# Patient Record
Sex: MALE | Race: ASIAN | NOT HISPANIC OR LATINO | URBAN - METROPOLITAN AREA
[De-identification: names, ages, dates, MRNs, and addresses within clinical notes are randomized per-mention and may not be internally consistent; named-entity substitution may affect disease eponyms.]

---

## 2020-09-02 ENCOUNTER — OFFICE VISIT (OUTPATIENT)
Dept: URBAN - METROPOLITAN AREA CLINIC 98 | Facility: CLINIC | Age: 80
End: 2020-09-02
Payer: MEDICARE

## 2020-09-02 VITALS
HEIGHT: 60 IN | WEIGHT: 129.4 LBS | BODY MASS INDEX: 25.4 KG/M2 | HEART RATE: 59 BPM | TEMPERATURE: 97.5 F | DIASTOLIC BLOOD PRESSURE: 69 MMHG | SYSTOLIC BLOOD PRESSURE: 129 MMHG

## 2020-09-02 DIAGNOSIS — R05 COUGH: ICD-10-CM

## 2020-09-02 DIAGNOSIS — K21.9 GERD: ICD-10-CM

## 2020-09-02 PROCEDURE — G8427 DOCREV CUR MEDS BY ELIG CLIN: HCPCS | Performed by: INTERNAL MEDICINE

## 2020-09-02 PROCEDURE — G9903 PT SCRN TBCO ID AS NON USER: HCPCS | Performed by: INTERNAL MEDICINE

## 2020-09-02 PROCEDURE — G8420 CALC BMI NORM PARAMETERS: HCPCS | Performed by: INTERNAL MEDICINE

## 2020-09-02 PROCEDURE — 99214 OFFICE O/P EST MOD 30 MIN: CPT | Performed by: INTERNAL MEDICINE

## 2020-09-02 RX ORDER — ATENOLOL 25 MG/1
TAKE 1 TABLET (25 MG) BY ORAL ROUTE ONCE DAILY TABLET ORAL 1
Qty: 0 | Refills: 0 | Status: ACTIVE | COMMUNITY
Start: 1900-01-01

## 2020-09-02 RX ORDER — ASPIRIN 81 MG/1
TAKE 1 TABLET (81 MG) BY ORAL ROUTE ONCE DAILY TABLET, COATED ORAL 1
Qty: 0 | Refills: 0 | Status: ACTIVE | COMMUNITY
Start: 1900-01-01

## 2020-09-02 RX ORDER — ESOMEPRAZOLE MAGNESIUM 40 MG
TAKE 1 CAPSULE (40 MG) BY ORAL ROUTE ONCE DAILY CAPSULE,DELAYED RELEASE (ENTERIC COATED) ORAL 1
Qty: 0 | Refills: 0 | Status: ACTIVE | COMMUNITY
Start: 1900-01-01

## 2020-09-02 RX ORDER — EPLERENONE 25 MG/1
TAKE 1 TABLET (25 MG) BY ORAL ROUTE ONCE DAILY TABLET, FILM COATED ORAL 1
Qty: 0 | Refills: 0 | Status: ACTIVE | COMMUNITY
Start: 1900-01-01

## 2020-09-02 RX ORDER — FAMOTIDINE 20 MG/1
1 TABLET AT BEDTIME AS NEEDED TABLET, FILM COATED ORAL ONCE A DAY
Qty: 30 TABLET | Refills: 1 | OUTPATIENT
Start: 2020-09-02

## 2020-09-02 RX ORDER — ESOMEPRAZOLE MAGNESIUM 40 MG
TAKE 1 CAPSULE (40 MG) BY ORAL ROUTE ONCE DAILY CAPSULE,DELAYED RELEASE (ENTERIC COATED) ORAL 1
Qty: 30 | Refills: 3
Start: 1900-01-01

## 2020-09-02 RX ORDER — FINASTERIDE 5 MG/1
TAKE 1 TABLET (5 MG) BY ORAL ROUTE ONCE DAILY TABLET, FILM COATED ORAL 1
Qty: 0 | Refills: 0 | Status: ACTIVE | COMMUNITY
Start: 1900-01-01

## 2020-09-02 NOTE — HPI-TODAY'S VISIT:
Mr. Scherer is an 78 yo M w/ history of partial colectomy for diverticulosis presenting with acid reflux. He has had GERD for 20 years. He was on omeprazole for several years and then he was switched to Nexium 40 mg PO daily. In February he began to have a cough at night. The cough has now changed and is constant during the day and night. His heartburn and acid regurgitation feel completely controlled on the Nexium. He feels he has a tickle high in his throat or something that he is trying to clear. He has no odynophagia or dysphagia. He says that he has a lung problem for which he sees an infectious disease team. He has no fever or chills. He has not seen ENT or pulmonology for this problem and says he has not had recent chest imaging. His cough is mostly dry but occasionally wet and he feels he has a runny nose.  His last EGD and colonoscopy were 3 years ago and he says this was normal.

## 2020-09-02 NOTE — EXAM-PHYSICAL EXAM
General Examination   Constitutional: well-developed, normal communication ability.   Eyes: Conjunctivae and eyelids appear normal, no scleral icterus.   Nose/nasopharynx, external nose: appear normal, normal appearance of lips. Blowing his nose during exam.   Neck/thyroid: normal appearance, no masses felt.   Chest: No lesions or deformities.  Pulmonary: crackles mid left lung fied and lower right lung field.   Cardiovascular: No edema, no murmurs or gallops appreciated.   Gastrointestinal: + scars, normoactive bowel sounds, soft, no tenderness, no rebound tenderness, no shifting dullness, no organomegaly.   Lymphatic: Neck without lymphadenopathy.   Musculoskeletal: normal gait and station, no tenderness present.   Skin: no rashes or jaundice. No tenderness on palpation.    Neurologic: Oriented to person, place, time. Normal sensation, short term memory intact.    Psychiatric: Normal mood and appropriate affect.

## 2021-01-06 ENCOUNTER — OFFICE VISIT (OUTPATIENT)
Dept: URBAN - METROPOLITAN AREA CLINIC 98 | Facility: CLINIC | Age: 81
End: 2021-01-06
Payer: MEDICARE

## 2021-01-06 VITALS
SYSTOLIC BLOOD PRESSURE: 110 MMHG | HEIGHT: 69 IN | WEIGHT: 130.8 LBS | TEMPERATURE: 97.2 F | DIASTOLIC BLOOD PRESSURE: 65 MMHG | BODY MASS INDEX: 19.37 KG/M2 | HEART RATE: 51 BPM

## 2021-01-06 DIAGNOSIS — K21.9 GERD: ICD-10-CM

## 2021-01-06 DIAGNOSIS — R05 COUGH: ICD-10-CM

## 2021-01-06 PROCEDURE — G8427 DOCREV CUR MEDS BY ELIG CLIN: HCPCS | Performed by: INTERNAL MEDICINE

## 2021-01-06 PROCEDURE — G8420 CALC BMI NORM PARAMETERS: HCPCS | Performed by: INTERNAL MEDICINE

## 2021-01-06 PROCEDURE — 99214 OFFICE O/P EST MOD 30 MIN: CPT | Performed by: INTERNAL MEDICINE

## 2021-01-06 PROCEDURE — 1036F TOBACCO NON-USER: CPT | Performed by: INTERNAL MEDICINE

## 2021-01-06 PROCEDURE — G8483 FLU IMM NO ADMIN DOC REA: HCPCS | Performed by: INTERNAL MEDICINE

## 2021-01-06 RX ORDER — EPLERENONE 25 MG/1
TAKE 1 TABLET (25 MG) BY ORAL ROUTE ONCE DAILY TABLET, FILM COATED ORAL 1
Qty: 0 | Refills: 0 | Status: ACTIVE | COMMUNITY
Start: 1900-01-01

## 2021-01-06 RX ORDER — ASPIRIN 81 MG/1
TAKE 1 TABLET (81 MG) BY ORAL ROUTE ONCE DAILY TABLET, COATED ORAL 1
Qty: 0 | Refills: 0 | Status: ACTIVE | COMMUNITY
Start: 1900-01-01

## 2021-01-06 RX ORDER — FAMOTIDINE 20 MG/1
1 TABLET AT BEDTIME AS NEEDED TABLET, FILM COATED ORAL ONCE A DAY
OUTPATIENT
Start: 2020-09-02

## 2021-01-06 RX ORDER — ATENOLOL 25 MG/1
TAKE 1 TABLET (25 MG) BY ORAL ROUTE ONCE DAILY TABLET ORAL 1
Qty: 0 | Refills: 0 | Status: ACTIVE | COMMUNITY
Start: 1900-01-01

## 2021-01-06 RX ORDER — FAMOTIDINE 20 MG/1
1 TABLET AT BEDTIME AS NEEDED TABLET, FILM COATED ORAL ONCE A DAY
Qty: 30 TABLET | Refills: 1 | Status: ACTIVE | COMMUNITY
Start: 2020-09-02

## 2021-01-06 RX ORDER — FINASTERIDE 5 MG/1
TAKE 1 TABLET (5 MG) BY ORAL ROUTE ONCE DAILY TABLET, FILM COATED ORAL 1
Qty: 0 | Refills: 0 | Status: ACTIVE | COMMUNITY
Start: 1900-01-01

## 2021-01-06 RX ORDER — ESOMEPRAZOLE MAGNESIUM 40 MG/1
1 CAPSULE CAPSULE, DELAYED RELEASE ORAL TWICE A DAY
Qty: 28 CAPSULE | Refills: 1 | OUTPATIENT
Start: 2021-01-06

## 2021-01-06 RX ORDER — ESOMEPRAZOLE MAGNESIUM 40 MG
TAKE 1 CAPSULE (40 MG) BY ORAL ROUTE ONCE DAILY CAPSULE,DELAYED RELEASE (ENTERIC COATED) ORAL 1
Qty: 30 | Refills: 3 | Status: ACTIVE | COMMUNITY
Start: 1900-01-01

## 2021-01-06 NOTE — HPI-TODAY'S VISIT:
Mr. Scherer is an 79 yo M w/ history of partial colectomy for diverticulosis, mycobacterium lung disease presenting with cough. He is taking Nexium 40 mg PO qDay in the mornings 1/2 hour before food. He has no abdominal pain, nausea or vomiting. He takes famotidine 40 mg daily which helped for about 3 days and then stopped being effective for the cough. The cough happens any time of day or night. It comes in spells. It is unrelated to meals. He has thick mucus he brings up. He went and saw ENT who saw no problems.    9/2/2020: Mr. 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 He has had GERD for 20 years. He was on omeprazole for several years and then he was switched to Nexium 40 mg PO daily. In February he began to have a cough at night. The cough has now changed and is constant during the day and night. His heartburn and acid regurgitation feel completely controlled on the Nexium. He feels he has a tickle high in his throat or something that he is trying to clear. He has no odynophagia or dysphagia. He says that he has a lung problem for which he sees an infectious disease team. He has no fever or chills. He has not seen ENT or pulmonology for this problem and says he has not had recent chest imaging. His cough is mostly dry but occasionally wet and he feels he has a runny nose.  His last EGD and colonoscopy were 3 years ago and he says this was normal.

## 2021-01-06 NOTE — EXAM-PHYSICAL EXAM
General Examination   Constitutional: well-developed, normal communication ability.   Eyes: Conjunctivae and eyelids appear normal, no scleral icterus.   Nose/nasopharynx, external nose: appear normal, normal appearance of lips.    Neck/thyroid: normal appearance, no masses felt.   Chest: No lesions or deformities.  Pulmonary: crackles mid left lung fied and lower right lung field.   Cardiovascular: No edema, no murmurs or gallops appreciated.   Gastrointestinal: + scars, normoactive bowel sounds, soft, mild epigastric tenderness, no rebound tenderness, no shifting dullness, no organomegaly.   Lymphatic: Neck without lymphadenopathy.   Musculoskeletal: normal gait and station, no tenderness present.   Skin: no rashes or jaundice. No tenderness on palpation.    Neurologic: Oriented to person, place, time. Normal sensation, short term memory intact.    Psychiatric: Normal mood and appropriate affect.

## 2021-01-14 ENCOUNTER — TELEPHONE ENCOUNTER (OUTPATIENT)
Dept: URBAN - METROPOLITAN AREA CLINIC 92 | Facility: CLINIC | Age: 81
End: 2021-01-14

## 2021-01-14 RX ORDER — ESOMEPRAZOLE MAGNESIUM 40 MG/1
1 CAPSULE CAPSULE, DELAYED RELEASE ORAL TWICE A DAY
Qty: 60 CAPSULE | Refills: 0
Start: 2021-01-06

## 2021-01-20 ENCOUNTER — OFFICE VISIT (OUTPATIENT)
Dept: URBAN - METROPOLITAN AREA MEDICAL CENTER 28 | Facility: MEDICAL CENTER | Age: 81
End: 2021-01-20

## 2021-02-17 ENCOUNTER — OFFICE VISIT (OUTPATIENT)
Dept: URBAN - METROPOLITAN AREA CLINIC 98 | Facility: CLINIC | Age: 81
End: 2021-02-17
Payer: MEDICARE

## 2021-02-17 VITALS
WEIGHT: 131 LBS | HEIGHT: 67 IN | TEMPERATURE: 96.7 F | HEART RATE: 56 BPM | BODY MASS INDEX: 20.56 KG/M2 | SYSTOLIC BLOOD PRESSURE: 107 MMHG | DIASTOLIC BLOOD PRESSURE: 67 MMHG

## 2021-02-17 DIAGNOSIS — R05 COUGH: ICD-10-CM

## 2021-02-17 DIAGNOSIS — R10.13 DYSPEPSIA: ICD-10-CM

## 2021-02-17 DIAGNOSIS — K21.9 GERD: ICD-10-CM

## 2021-02-17 PROCEDURE — 1036F TOBACCO NON-USER: CPT | Performed by: INTERNAL MEDICINE

## 2021-02-17 PROCEDURE — G8427 DOCREV CUR MEDS BY ELIG CLIN: HCPCS | Performed by: INTERNAL MEDICINE

## 2021-02-17 PROCEDURE — 99214 OFFICE O/P EST MOD 30 MIN: CPT | Performed by: INTERNAL MEDICINE

## 2021-02-17 PROCEDURE — G8420 CALC BMI NORM PARAMETERS: HCPCS | Performed by: INTERNAL MEDICINE

## 2021-02-17 PROCEDURE — G8483 FLU IMM NO ADMIN DOC REA: HCPCS | Performed by: INTERNAL MEDICINE

## 2021-02-17 RX ORDER — ESOMEPRAZOLE MAGNESIUM 40 MG/1
1 CAPSULE CAPSULE, DELAYED RELEASE ORAL TWICE A DAY
Qty: 60 CAPSULE | Refills: 0 | Status: ACTIVE | COMMUNITY
Start: 2021-01-06

## 2021-02-17 RX ORDER — FINASTERIDE 5 MG/1
TAKE 1 TABLET (5 MG) BY ORAL ROUTE ONCE DAILY TABLET, FILM COATED ORAL 1
Qty: 0 | Refills: 0 | Status: ACTIVE | COMMUNITY
Start: 1900-01-01

## 2021-02-17 RX ORDER — EPLERENONE 25 MG/1
TAKE 1 TABLET (25 MG) BY ORAL ROUTE ONCE DAILY TABLET, FILM COATED ORAL 1
Qty: 0 | Refills: 0 | Status: ACTIVE | COMMUNITY
Start: 1900-01-01

## 2021-02-17 RX ORDER — ATENOLOL 25 MG/1
TAKE 1 TABLET (25 MG) BY ORAL ROUTE ONCE DAILY TABLET ORAL 1
Qty: 0 | Refills: 0 | Status: ACTIVE | COMMUNITY
Start: 1900-01-01

## 2021-02-17 RX ORDER — ESOMEPRAZOLE MAGNESIUM 40 MG
TAKE 1 CAPSULE (40 MG) BY ORAL ROUTE ONCE DAILY CAPSULE,DELAYED RELEASE (ENTERIC COATED) ORAL 1
OUTPATIENT
Start: 1900-01-01

## 2021-02-17 RX ORDER — ESOMEPRAZOLE MAGNESIUM 40 MG
TAKE 1 CAPSULE (40 MG) BY ORAL ROUTE ONCE DAILY CAPSULE,DELAYED RELEASE (ENTERIC COATED) ORAL 1
Qty: 30 | Refills: 3 | Status: ACTIVE | COMMUNITY
Start: 1900-01-01

## 2021-02-17 RX ORDER — ESOMEPRAZOLE MAGNESIUM 40 MG/1
1 CAPSULE CAPSULE, DELAYED RELEASE ORAL ONCE A DAY
Qty: 90 CAPSULE | Refills: 3 | OUTPATIENT
Start: 2021-02-17

## 2021-02-17 RX ORDER — FAMOTIDINE 20 MG/1
1 TABLET AT BEDTIME AS NEEDED TABLET, FILM COATED ORAL ONCE A DAY
Status: ACTIVE | COMMUNITY
Start: 2020-09-02

## 2021-02-17 RX ORDER — ESOMEPRAZOLE MAGNESIUM 40 MG/1
1 CAPSULE CAPSULE, DELAYED RELEASE ORAL TWICE A DAY
OUTPATIENT
Start: 2021-01-06

## 2021-02-17 RX ORDER — ASPIRIN 81 MG/1
TAKE 1 TABLET (81 MG) BY ORAL ROUTE ONCE DAILY TABLET, COATED ORAL 1
Qty: 0 | Refills: 0 | Status: ACTIVE | COMMUNITY
Start: 1900-01-01

## 2021-02-17 NOTE — HPI-TODAY'S VISIT:
Mr. Scherer is an 81 yo M w/ history of partial colectomy for diverticulosis, mycobacterium lung disease presenting with cough.   //GERD: Started Nexium 40 mg PO BID at last visit up from once per day. He has no heartburn at this time or dyspepsia. No dysphagia or odynophagia. He feels he cannot be on less than once per day Nexium without having dyspepsia. //Cough: He is still coughing mostly at night. His symptoms improved with BID Nexium for 1-2 weeks but then the cough returned.   1/6/21 visit: He is taking Nexium 40 mg PO qDay in the mornings 1/2 hour before food. He has no abdominal pain, nausea or vomiting. He takes famotidine 40 mg daily which helped for about 3 days and then stopped being effective for the cough. The cough happens any time of day or night. It comes in spells. It is unrelated to meals. He has thick mucus he brings up. He went and saw ENT who saw no problems.    9/2/2020: Mr. 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 He has had GERD for 20 years. He was on omeprazole for several years and then he was switched to Nexium 40 mg PO daily. In February he began to have a cough at night. The cough has now changed and is constant during the day and night. His heartburn and acid regurgitation feel completely controlled on the Nexium. He feels he has a tickle high in his throat or something that he is trying to clear. He has no odynophagia or dysphagia. He says that he has a lung problem for which he sees an infectious disease team. He has no fever or chills. He has not seen ENT or pulmonology for this problem and says he has not had recent chest imaging. His cough is mostly dry but occasionally wet and he feels he has a runny nose.  His last EGD and colonoscopy were in 2017 and he says these were normal.

## 2021-02-18 ENCOUNTER — OFFICE VISIT (OUTPATIENT)
Dept: URBAN - METROPOLITAN AREA SURGERY CENTER 18 | Facility: SURGERY CENTER | Age: 81
End: 2021-02-18
Payer: MEDICARE

## 2021-02-18 DIAGNOSIS — Z12.11 COLON CANCER SCREENING: ICD-10-CM

## 2021-02-18 PROCEDURE — 992 NON-BILLABLE: Performed by: INTERNAL MEDICINE

## 2021-02-18 RX ORDER — ESOMEPRAZOLE MAGNESIUM 40 MG/1
1 CAPSULE CAPSULE, DELAYED RELEASE ORAL ONCE A DAY
Qty: 90 CAPSULE | Refills: 3 | Status: ACTIVE | COMMUNITY
Start: 2021-02-17

## 2021-02-18 RX ORDER — ASPIRIN 81 MG/1
TAKE 1 TABLET (81 MG) BY ORAL ROUTE ONCE DAILY TABLET, COATED ORAL 1
Qty: 0 | Refills: 0 | Status: ACTIVE | COMMUNITY
Start: 1900-01-01

## 2021-02-18 RX ORDER — FAMOTIDINE 20 MG/1
1 TABLET AT BEDTIME AS NEEDED TABLET, FILM COATED ORAL ONCE A DAY
Status: ACTIVE | COMMUNITY
Start: 2020-09-02

## 2021-02-18 RX ORDER — FINASTERIDE 5 MG/1
TAKE 1 TABLET (5 MG) BY ORAL ROUTE ONCE DAILY TABLET, FILM COATED ORAL 1
Qty: 0 | Refills: 0 | Status: ACTIVE | COMMUNITY
Start: 1900-01-01

## 2021-02-18 RX ORDER — EPLERENONE 25 MG/1
TAKE 1 TABLET (25 MG) BY ORAL ROUTE ONCE DAILY TABLET, FILM COATED ORAL 1
Qty: 0 | Refills: 0 | Status: ACTIVE | COMMUNITY
Start: 1900-01-01

## 2021-02-18 RX ORDER — ATENOLOL 25 MG/1
TAKE 1 TABLET (25 MG) BY ORAL ROUTE ONCE DAILY TABLET ORAL 1
Qty: 0 | Refills: 0 | Status: ACTIVE | COMMUNITY
Start: 1900-01-01

## 2021-02-19 ENCOUNTER — OFFICE VISIT (OUTPATIENT)
Dept: URBAN - METROPOLITAN AREA CLINIC 98 | Facility: CLINIC | Age: 81
End: 2021-02-19

## 2021-03-03 ENCOUNTER — OFFICE VISIT (OUTPATIENT)
Dept: URBAN - METROPOLITAN AREA MEDICAL CENTER 28 | Facility: MEDICAL CENTER | Age: 81
End: 2021-03-03

## 2021-03-17 ENCOUNTER — OFFICE VISIT (OUTPATIENT)
Dept: URBAN - METROPOLITAN AREA MEDICAL CENTER 28 | Facility: MEDICAL CENTER | Age: 81
End: 2021-03-17

## 2021-03-19 PROBLEM — 15167005 ALCOHOL ABUSE: Status: ACTIVE | Noted: 2021-03-19

## 2021-04-07 ENCOUNTER — OFFICE VISIT (OUTPATIENT)
Dept: URBAN - METROPOLITAN AREA MEDICAL CENTER 28 | Facility: MEDICAL CENTER | Age: 81
End: 2021-04-07
Payer: MEDICARE

## 2021-04-07 DIAGNOSIS — K30 ACID INDIGESTION: ICD-10-CM

## 2021-04-07 DIAGNOSIS — K29.60 ADENOPAPILLOMATOSIS GASTRICA: ICD-10-CM

## 2021-04-07 DIAGNOSIS — K20.80 ESOPHAGITIS, LOS ANGELES GRADE A: ICD-10-CM

## 2021-04-07 PROCEDURE — 43239 EGD BIOPSY SINGLE/MULTIPLE: CPT | Performed by: INTERNAL MEDICINE

## 2021-04-21 ENCOUNTER — OFFICE VISIT (OUTPATIENT)
Dept: URBAN - METROPOLITAN AREA CLINIC 98 | Facility: CLINIC | Age: 81
End: 2021-04-21

## 2021-05-19 ENCOUNTER — OFFICE VISIT (OUTPATIENT)
Dept: URBAN - METROPOLITAN AREA CLINIC 98 | Facility: CLINIC | Age: 81
End: 2021-05-19
Payer: MEDICARE

## 2021-05-19 VITALS
BODY MASS INDEX: 20.65 KG/M2 | DIASTOLIC BLOOD PRESSURE: 64 MMHG | SYSTOLIC BLOOD PRESSURE: 103 MMHG | TEMPERATURE: 97.3 F | WEIGHT: 131.6 LBS | HEIGHT: 67 IN | HEART RATE: 64 BPM

## 2021-05-19 DIAGNOSIS — J31.0 RHINITIS, CHRONIC: ICD-10-CM

## 2021-05-19 DIAGNOSIS — K21.9 GERD: ICD-10-CM

## 2021-05-19 DIAGNOSIS — R05 COUGH: ICD-10-CM

## 2021-05-19 PROBLEM — 49727002 COUGH: Status: ACTIVE | Noted: 2020-09-02

## 2021-05-19 PROBLEM — 86094006: Status: ACTIVE | Noted: 2021-05-19

## 2021-05-19 PROCEDURE — 99214 OFFICE O/P EST MOD 30 MIN: CPT | Performed by: INTERNAL MEDICINE

## 2021-05-19 RX ORDER — LORATADINE 10 MG
1 TABLET TABLET ORAL ONCE A DAY
Status: ACTIVE | COMMUNITY

## 2021-05-19 RX ORDER — FAMOTIDINE 20 MG/1
1 TABLET AT BEDTIME AS NEEDED TABLET, FILM COATED ORAL ONCE A DAY
Status: ACTIVE | COMMUNITY
Start: 2020-09-02

## 2021-05-19 RX ORDER — LORATADINE 10 MG
1 TABLET TABLET ORAL ONCE A DAY
OUTPATIENT

## 2021-05-19 RX ORDER — ESOMEPRAZOLE MAGNESIUM 40 MG/1
1 CAPSULE CAPSULE, DELAYED RELEASE ORAL ONCE A DAY
Qty: 90 CAPSULE | Refills: 3
Start: 2021-02-17

## 2021-05-19 RX ORDER — ESOMEPRAZOLE MAGNESIUM 40 MG/1
1 CAPSULE CAPSULE, DELAYED RELEASE ORAL ONCE A DAY
Qty: 90 CAPSULE | Refills: 3 | Status: ACTIVE | COMMUNITY
Start: 2021-02-17

## 2021-05-19 RX ORDER — EPLERENONE 25 MG/1
TAKE 1 TABLET (25 MG) BY ORAL ROUTE ONCE DAILY TABLET, FILM COATED ORAL 1
Qty: 0 | Refills: 0 | Status: ACTIVE | COMMUNITY
Start: 1900-01-01

## 2021-05-19 RX ORDER — ATENOLOL 25 MG/1
TAKE 1 TABLET (25 MG) BY ORAL ROUTE ONCE DAILY TABLET ORAL 1
Qty: 0 | Refills: 0 | Status: ACTIVE | COMMUNITY
Start: 1900-01-01

## 2021-05-19 RX ORDER — ASPIRIN 81 MG/1
TAKE 1 TABLET (81 MG) BY ORAL ROUTE ONCE DAILY TABLET, COATED ORAL 1
Qty: 0 | Refills: 0 | Status: ACTIVE | COMMUNITY
Start: 1900-01-01

## 2021-05-19 RX ORDER — FINASTERIDE 5 MG/1
TAKE 1 TABLET (5 MG) BY ORAL ROUTE ONCE DAILY TABLET, FILM COATED ORAL 1
Qty: 0 | Refills: 0 | Status: ACTIVE | COMMUNITY
Start: 1900-01-01

## 2021-05-19 NOTE — HPI-TODAY'S VISIT:
Mr. Scherer is an 79 yo M w/ history of partial colectomy for diverticulosis, mycobacterium lung disease presenting with cough.   //GERD: He is on twice daily Nexium after going back down to once per day. He says he feels no different on twice daily vs. once daily but if he stops altogether he has heartburn again. He has heartburn once every 2 weeks on medication. No dysphagia or odynophagia.  //Cough: He started taking allergy medication and  has had marked improvement in his symptoms. He is not producing any mucus. He coughs a few times per week instead of many times daily. He was having rhinitis that is also improved with the allergy medication.    9/2/2020: Mr. 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 He has had GERD for 20 years. He was on omeprazole for several years and then he was switched to Nexium 40 mg PO daily. In February he began to have a cough at night. The cough has now changed and is constant during the day and night. His heartburn and acid regurgitation feel completely controlled on the Nexium. He feels he has a tickle high in his throat or something that he is trying to clear. He has no odynophagia or dysphagia. He says that he has a lung problem for which he sees an infectious disease team. He has no fever or chills. He has not seen ENT or pulmonology for this problem and says he has not had recent chest imaging. His cough is mostly dry but occasionally wet and he feels he has a runny nose.  His last EGD and colonoscopy were in 2017 and he says these were normal.   I personally reviewed:  4/7/21 EGD: normal. biopsies taken throughout 4/7/21 EGD path: chronic mild inflammation in the stomach. Normal esophagus and duodenum biopsies.

## 2021-05-28 ENCOUNTER — DASHBOARD ENCOUNTERS (OUTPATIENT)
Age: 81
End: 2021-05-28

## 2021-09-22 ENCOUNTER — OFFICE VISIT (OUTPATIENT)
Dept: URBAN - METROPOLITAN AREA CLINIC 98 | Facility: CLINIC | Age: 81
End: 2021-09-22

## 2022-06-17 ENCOUNTER — ERX REFILL RESPONSE (OUTPATIENT)
Dept: URBAN - METROPOLITAN AREA CLINIC 98 | Facility: CLINIC | Age: 82
End: 2022-06-17

## 2022-06-17 RX ORDER — ESOMEPRAZOLE MAGNESIUM 40 MG/1
1 CAPSULE CAPSULE, DELAYED RELEASE ORAL ONCE A DAY
Qty: 90 CAPSULE | Refills: 3 | OUTPATIENT

## 2022-06-17 RX ORDER — ESOMEPRAZOLE MAGNESIUM 40 MG/1
TAKE ONE CAPSULE BY MOUTH DAILY FOR ACID REFLUX CAPSULE, DELAYED RELEASE ORAL
Qty: 90 CAPSULE | Refills: 2 | OUTPATIENT

## 2024-08-16 ENCOUNTER — OFFICE VISIT (OUTPATIENT)
Dept: URBAN - METROPOLITAN AREA CLINIC 111 | Facility: CLINIC | Age: 84
End: 2024-08-16
Payer: MEDICARE

## 2024-08-16 ENCOUNTER — LAB OUTSIDE AN ENCOUNTER (OUTPATIENT)
Dept: URBAN - METROPOLITAN AREA CLINIC 111 | Facility: CLINIC | Age: 84
End: 2024-08-16

## 2024-08-16 VITALS
TEMPERATURE: 97.2 F | BODY MASS INDEX: 20.91 KG/M2 | HEIGHT: 67 IN | SYSTOLIC BLOOD PRESSURE: 122 MMHG | WEIGHT: 133.2 LBS | DIASTOLIC BLOOD PRESSURE: 71 MMHG | HEART RATE: 60 BPM

## 2024-08-16 DIAGNOSIS — R14.0 BLOATING AND GAS: ICD-10-CM

## 2024-08-16 DIAGNOSIS — R68.81 EARLY SATIETY: ICD-10-CM

## 2024-08-16 DIAGNOSIS — R63.0 DECREASED APPETITE: ICD-10-CM

## 2024-08-16 PROCEDURE — 99204 OFFICE O/P NEW MOD 45 MIN: CPT | Performed by: PHYSICIAN ASSISTANT

## 2024-08-16 RX ORDER — ASPIRIN 81 MG/1
TAKE 1 TABLET (81 MG) BY ORAL ROUTE ONCE DAILY TABLET, COATED ORAL 1
Qty: 0 | Refills: 0 | Status: ON HOLD | COMMUNITY
Start: 1900-01-01

## 2024-08-16 RX ORDER — EPLERENONE 25 MG/1
TAKE 1 TABLET (25 MG) BY ORAL ROUTE ONCE DAILY TABLET, FILM COATED ORAL 1
Qty: 0 | Refills: 0 | Status: ACTIVE | COMMUNITY
Start: 1900-01-01

## 2024-08-16 RX ORDER — ATENOLOL 25 MG/1
TAKE 1 TABLET (25 MG) BY ORAL ROUTE ONCE DAILY TABLET ORAL 1
Qty: 0 | Refills: 0 | Status: ACTIVE | COMMUNITY
Start: 1900-01-01

## 2024-08-16 RX ORDER — FINASTERIDE 5 MG/1
TAKE 1 TABLET (5 MG) BY ORAL ROUTE ONCE DAILY TABLET, FILM COATED ORAL 1
Qty: 0 | Refills: 0 | Status: ON HOLD | COMMUNITY
Start: 1900-01-01

## 2024-08-16 RX ORDER — FAMOTIDINE 20 MG/1
1 TABLET AT BEDTIME AS NEEDED TABLET, FILM COATED ORAL ONCE A DAY
Status: ON HOLD | COMMUNITY
Start: 2020-09-02

## 2024-08-16 RX ORDER — ESOMEPRAZOLE MAGNESIUM 40 MG/1
TAKE ONE CAPSULE BY MOUTH DAILY FOR ACID REFLUX CAPSULE, DELAYED RELEASE ORAL
Qty: 90 CAPSULE | Refills: 2 | Status: ACTIVE | COMMUNITY

## 2024-08-16 RX ORDER — LORATADINE 10 MG
1 TABLET TABLET ORAL ONCE A DAY
Status: ON HOLD | COMMUNITY

## 2024-08-16 NOTE — HPI-TODAY'S VISIT:
82 y/o male here with gas and bloating. Seen in 2021 by Dr. Saldivar for cough, GERD, and chronic rhinitis. Pt was told to continue Loratidine and was decreased from Nexium 40 mg BID to once daily. He has h/o partial colectomy for diverticular disease and mycobacterium lung disease with acid reflux. S/p EGD in 4/2021 that was normal. Path unimpressive other than mild inflammation in the stomach.  He is having a lot of gas if he eats and having bloating. Symptoms have worsened in the last year. Some abdominal discomfort but no severe pain. He had bowel surgery a few years ago for diverticular bleeding. He can have anywhere from 1-5 stools a day. He can be constipated or have loose stool. He takes metamucil BID which helps. No rectal bleeding. No weight loss. Weight is stable from 3 years ago. He has had more bloating since surgery and he has a hernia. He watches what he eats d/t cardiac disease. He has h/o triple bypass and is on Eliquis. He avoids fried foods. S/p colonoscopy a few years ago. He may have had a few polyps. He is taking Nexium 40 mg daily with good results. He also reports some early satiety and eating less now.

## 2024-09-03 ENCOUNTER — TELEPHONE ENCOUNTER (OUTPATIENT)
Dept: URBAN - METROPOLITAN AREA CLINIC 111 | Facility: CLINIC | Age: 84
End: 2024-09-03

## 2024-09-03 ENCOUNTER — LAB OUTSIDE AN ENCOUNTER (OUTPATIENT)
Dept: URBAN - METROPOLITAN AREA CLINIC 23 | Facility: CLINIC | Age: 84
End: 2024-09-03

## 2024-09-03 LAB
CREATININE POC: 0.9
PERFORMING LAB: (no result)